# Patient Record
Sex: MALE | Race: WHITE | NOT HISPANIC OR LATINO | Employment: OTHER | URBAN - METROPOLITAN AREA
[De-identification: names, ages, dates, MRNs, and addresses within clinical notes are randomized per-mention and may not be internally consistent; named-entity substitution may affect disease eponyms.]

---

## 2017-04-17 ENCOUNTER — HOSPITAL ENCOUNTER (OUTPATIENT)
Dept: SLEEP MEDICINE | Facility: HOSPITAL | Age: 32
Discharge: HOME | End: 2017-04-17
Attending: FAMILY MEDICINE

## 2017-04-17 PROCEDURE — 95806 SLEEP STUDY UNATT&RESP EFFT: CPT

## 2021-02-24 PROBLEM — R10.9 ABDOMINAL PAIN: Status: ACTIVE | Noted: 2021-02-24

## 2021-04-28 ENCOUNTER — HOSPITAL ENCOUNTER (INPATIENT)
Facility: HOSPITAL | Age: 36
LOS: 5 days | Discharge: HOME | DRG: 885 | End: 2021-05-03
Attending: STUDENT IN AN ORGANIZED HEALTH CARE EDUCATION/TRAINING PROGRAM | Admitting: STUDENT IN AN ORGANIZED HEALTH CARE EDUCATION/TRAINING PROGRAM
Payer: COMMERCIAL

## 2021-04-28 DIAGNOSIS — R45.851 DEPRESSION WITH SUICIDAL IDEATION: ICD-10-CM

## 2021-04-28 DIAGNOSIS — I10 ESSENTIAL HYPERTENSION: ICD-10-CM

## 2021-04-28 DIAGNOSIS — I10 HYPERTENSION, UNSPECIFIED TYPE: ICD-10-CM

## 2021-04-28 DIAGNOSIS — F32.A DEPRESSION WITH SUICIDAL IDEATION: ICD-10-CM

## 2021-04-28 DIAGNOSIS — F33.2 MDD (MAJOR DEPRESSIVE DISORDER), RECURRENT SEVERE, WITHOUT PSYCHOSIS: Primary | ICD-10-CM

## 2021-04-28 LAB
CHOLEST SERPL-MCNC: 122 MG/DL (ref 120–199)
CHOLEST/HDLC SERPL: 3.1 {RATIO} (ref 2–5)
ESTIMATED AVG GLUCOSE: 88 MG/DL (ref 68–131)
HBA1C MFR BLD: 4.7 % (ref 4–5.6)
HDLC SERPL-MCNC: 39 MG/DL (ref 40–75)
HDLC SERPL: 32 % (ref 20–50)
LDLC SERPL CALC-MCNC: 65.6 MG/DL (ref 63–159)
NONHDLC SERPL-MCNC: 83 MG/DL
TRIGL SERPL-MCNC: 87 MG/DL (ref 30–150)

## 2021-04-28 PROCEDURE — 99252 PR INITIAL INPATIENT CONSULT,LEVL II: ICD-10-PCS | Mod: ,,, | Performed by: NURSE PRACTITIONER

## 2021-04-28 PROCEDURE — 99252 IP/OBS CONSLTJ NEW/EST SF 35: CPT | Mod: ,,, | Performed by: NURSE PRACTITIONER

## 2021-04-28 PROCEDURE — 99223 PR INITIAL HOSPITAL CARE,LEVL III: ICD-10-PCS | Mod: ,,, | Performed by: PSYCHIATRY & NEUROLOGY

## 2021-04-28 PROCEDURE — S4991 NICOTINE PATCH NONLEGEND: HCPCS | Performed by: STUDENT IN AN ORGANIZED HEALTH CARE EDUCATION/TRAINING PROGRAM

## 2021-04-28 PROCEDURE — 99223 1ST HOSP IP/OBS HIGH 75: CPT | Mod: ,,, | Performed by: PSYCHIATRY & NEUROLOGY

## 2021-04-28 PROCEDURE — 25000003 PHARM REV CODE 250: Performed by: PSYCHIATRY & NEUROLOGY

## 2021-04-28 PROCEDURE — 25000003 PHARM REV CODE 250: Performed by: STUDENT IN AN ORGANIZED HEALTH CARE EDUCATION/TRAINING PROGRAM

## 2021-04-28 PROCEDURE — 80061 LIPID PANEL: CPT | Performed by: STUDENT IN AN ORGANIZED HEALTH CARE EDUCATION/TRAINING PROGRAM

## 2021-04-28 PROCEDURE — 11400000 HC PSYCH PRIVATE ROOM

## 2021-04-28 PROCEDURE — 83036 HEMOGLOBIN GLYCOSYLATED A1C: CPT | Performed by: STUDENT IN AN ORGANIZED HEALTH CARE EDUCATION/TRAINING PROGRAM

## 2021-04-28 PROCEDURE — 90833 PSYTX W PT W E/M 30 MIN: CPT | Mod: ,,, | Performed by: PSYCHIATRY & NEUROLOGY

## 2021-04-28 PROCEDURE — 36415 COLL VENOUS BLD VENIPUNCTURE: CPT | Performed by: STUDENT IN AN ORGANIZED HEALTH CARE EDUCATION/TRAINING PROGRAM

## 2021-04-28 PROCEDURE — 90833 PR PSYCHOTHERAPY W/PATIENT W/E&M, 30 MIN (ADD ON): ICD-10-PCS | Mod: ,,, | Performed by: PSYCHIATRY & NEUROLOGY

## 2021-04-28 RX ORDER — FOLIC ACID 1 MG/1
1 TABLET ORAL DAILY
Status: DISCONTINUED | OUTPATIENT
Start: 2021-04-28 | End: 2021-05-03 | Stop reason: HOSPADM

## 2021-04-28 RX ORDER — HYDROXYZINE PAMOATE 50 MG/1
50 CAPSULE ORAL EVERY 6 HOURS PRN
Status: DISCONTINUED | OUTPATIENT
Start: 2021-04-28 | End: 2021-05-03 | Stop reason: HOSPADM

## 2021-04-28 RX ORDER — MUPIROCIN 20 MG/G
OINTMENT TOPICAL 2 TIMES DAILY
Status: DISCONTINUED | OUTPATIENT
Start: 2021-04-28 | End: 2021-04-28

## 2021-04-28 RX ORDER — MIRTAZAPINE 15 MG/1
15 TABLET, ORALLY DISINTEGRATING ORAL NIGHTLY
Status: DISCONTINUED | OUTPATIENT
Start: 2021-04-28 | End: 2021-05-03 | Stop reason: HOSPADM

## 2021-04-28 RX ORDER — OLANZAPINE 10 MG/2ML
10 INJECTION, POWDER, FOR SOLUTION INTRAMUSCULAR EVERY 4 HOURS PRN
Status: DISCONTINUED | OUTPATIENT
Start: 2021-04-28 | End: 2021-05-03 | Stop reason: HOSPADM

## 2021-04-28 RX ORDER — IBUPROFEN 200 MG
1 TABLET ORAL DAILY
Status: DISCONTINUED | OUTPATIENT
Start: 2021-04-28 | End: 2021-05-03 | Stop reason: HOSPADM

## 2021-04-28 RX ORDER — LISINOPRIL 10 MG/1
10 TABLET ORAL DAILY
Status: DISCONTINUED | OUTPATIENT
Start: 2021-04-28 | End: 2021-05-03 | Stop reason: HOSPADM

## 2021-04-28 RX ORDER — OLANZAPINE 10 MG/1
10 TABLET ORAL EVERY 4 HOURS PRN
Status: DISCONTINUED | OUTPATIENT
Start: 2021-04-28 | End: 2021-05-03 | Stop reason: HOSPADM

## 2021-04-28 RX ADMIN — HYDROXYZINE PAMOATE 50 MG: 50 CAPSULE ORAL at 02:04

## 2021-04-28 RX ADMIN — MIRTAZAPINE 15 MG: 15 TABLET, ORALLY DISINTEGRATING ORAL at 08:04

## 2021-04-28 RX ADMIN — LISINOPRIL 10 MG: 10 TABLET ORAL at 11:04

## 2021-04-28 RX ADMIN — NICOTINE 1 PATCH: 14 PATCH, EXTENDED RELEASE TRANSDERMAL at 08:04

## 2021-04-28 RX ADMIN — FOLIC ACID 1 MG: 1 TABLET ORAL at 08:04

## 2021-04-28 RX ADMIN — THERA TABS 1 TABLET: TAB at 08:04

## 2021-04-29 PROCEDURE — 25000003 PHARM REV CODE 250: Performed by: PSYCHIATRY & NEUROLOGY

## 2021-04-29 PROCEDURE — 90833 PR PSYCHOTHERAPY W/PATIENT W/E&M, 30 MIN (ADD ON): ICD-10-PCS | Mod: ,,, | Performed by: PSYCHIATRY & NEUROLOGY

## 2021-04-29 PROCEDURE — 11400000 HC PSYCH PRIVATE ROOM

## 2021-04-29 PROCEDURE — 99233 PR SUBSEQUENT HOSPITAL CARE,LEVL III: ICD-10-PCS | Mod: ,,, | Performed by: PSYCHIATRY & NEUROLOGY

## 2021-04-29 PROCEDURE — 90833 PSYTX W PT W E/M 30 MIN: CPT | Mod: ,,, | Performed by: PSYCHIATRY & NEUROLOGY

## 2021-04-29 PROCEDURE — 99233 SBSQ HOSP IP/OBS HIGH 50: CPT | Mod: ,,, | Performed by: PSYCHIATRY & NEUROLOGY

## 2021-04-29 RX ADMIN — LISINOPRIL 10 MG: 10 TABLET ORAL at 08:04

## 2021-04-29 RX ADMIN — MIRTAZAPINE 15 MG: 15 TABLET, ORALLY DISINTEGRATING ORAL at 08:04

## 2021-04-29 RX ADMIN — THERA TABS 1 TABLET: TAB at 08:04

## 2021-04-29 RX ADMIN — FOLIC ACID 1 MG: 1 TABLET ORAL at 08:04

## 2021-04-30 PROCEDURE — 99233 SBSQ HOSP IP/OBS HIGH 50: CPT | Mod: ,,, | Performed by: STUDENT IN AN ORGANIZED HEALTH CARE EDUCATION/TRAINING PROGRAM

## 2021-04-30 PROCEDURE — 11400000 HC PSYCH PRIVATE ROOM

## 2021-04-30 PROCEDURE — 25000003 PHARM REV CODE 250: Performed by: PSYCHIATRY & NEUROLOGY

## 2021-04-30 PROCEDURE — 25000003 PHARM REV CODE 250: Performed by: STUDENT IN AN ORGANIZED HEALTH CARE EDUCATION/TRAINING PROGRAM

## 2021-04-30 PROCEDURE — 99233 PR SUBSEQUENT HOSPITAL CARE,LEVL III: ICD-10-PCS | Mod: ,,, | Performed by: STUDENT IN AN ORGANIZED HEALTH CARE EDUCATION/TRAINING PROGRAM

## 2021-04-30 RX ORDER — SERTRALINE HYDROCHLORIDE 50 MG/1
50 TABLET, FILM COATED ORAL DAILY
Status: DISCONTINUED | OUTPATIENT
Start: 2021-04-30 | End: 2021-05-03 | Stop reason: HOSPADM

## 2021-04-30 RX ADMIN — FOLIC ACID 1 MG: 1 TABLET ORAL at 08:04

## 2021-04-30 RX ADMIN — MIRTAZAPINE 15 MG: 15 TABLET, ORALLY DISINTEGRATING ORAL at 08:04

## 2021-04-30 RX ADMIN — SERTRALINE HYDROCHLORIDE 50 MG: 50 TABLET ORAL at 12:04

## 2021-04-30 RX ADMIN — LISINOPRIL 10 MG: 10 TABLET ORAL at 08:04

## 2021-04-30 RX ADMIN — THERA TABS 1 TABLET: TAB at 08:04

## 2021-05-01 PROCEDURE — 99233 SBSQ HOSP IP/OBS HIGH 50: CPT | Mod: ,,, | Performed by: PSYCHIATRY & NEUROLOGY

## 2021-05-01 PROCEDURE — 25000003 PHARM REV CODE 250: Performed by: PSYCHIATRY & NEUROLOGY

## 2021-05-01 PROCEDURE — 90833 PSYTX W PT W E/M 30 MIN: CPT | Mod: ,,, | Performed by: PSYCHIATRY & NEUROLOGY

## 2021-05-01 PROCEDURE — 25000003 PHARM REV CODE 250: Performed by: STUDENT IN AN ORGANIZED HEALTH CARE EDUCATION/TRAINING PROGRAM

## 2021-05-01 PROCEDURE — 99233 PR SUBSEQUENT HOSPITAL CARE,LEVL III: ICD-10-PCS | Mod: ,,, | Performed by: PSYCHIATRY & NEUROLOGY

## 2021-05-01 PROCEDURE — 90833 PR PSYCHOTHERAPY W/PATIENT W/E&M, 30 MIN (ADD ON): ICD-10-PCS | Mod: ,,, | Performed by: PSYCHIATRY & NEUROLOGY

## 2021-05-01 PROCEDURE — 11400000 HC PSYCH PRIVATE ROOM

## 2021-05-01 RX ADMIN — THERA TABS 1 TABLET: TAB at 08:05

## 2021-05-01 RX ADMIN — LISINOPRIL 10 MG: 10 TABLET ORAL at 08:05

## 2021-05-01 RX ADMIN — FOLIC ACID 1 MG: 1 TABLET ORAL at 08:05

## 2021-05-01 RX ADMIN — SERTRALINE HYDROCHLORIDE 50 MG: 50 TABLET ORAL at 08:05

## 2021-05-01 RX ADMIN — MIRTAZAPINE 15 MG: 15 TABLET, ORALLY DISINTEGRATING ORAL at 08:05

## 2021-05-02 PROBLEM — F32.A DEPRESSION WITH SUICIDAL IDEATION: Status: RESOLVED | Noted: 2021-04-28 | Resolved: 2021-05-02

## 2021-05-02 PROBLEM — R45.851 DEPRESSION WITH SUICIDAL IDEATION: Status: RESOLVED | Noted: 2021-04-28 | Resolved: 2021-05-02

## 2021-05-02 PROCEDURE — 25000003 PHARM REV CODE 250: Performed by: PSYCHIATRY & NEUROLOGY

## 2021-05-02 PROCEDURE — 25000003 PHARM REV CODE 250: Performed by: STUDENT IN AN ORGANIZED HEALTH CARE EDUCATION/TRAINING PROGRAM

## 2021-05-02 PROCEDURE — 99233 PR SUBSEQUENT HOSPITAL CARE,LEVL III: ICD-10-PCS | Mod: ,,, | Performed by: PSYCHIATRY & NEUROLOGY

## 2021-05-02 PROCEDURE — 90833 PR PSYCHOTHERAPY W/PATIENT W/E&M, 30 MIN (ADD ON): ICD-10-PCS | Mod: ,,, | Performed by: PSYCHIATRY & NEUROLOGY

## 2021-05-02 PROCEDURE — 99233 SBSQ HOSP IP/OBS HIGH 50: CPT | Mod: ,,, | Performed by: PSYCHIATRY & NEUROLOGY

## 2021-05-02 PROCEDURE — 11400000 HC PSYCH PRIVATE ROOM

## 2021-05-02 PROCEDURE — 90833 PSYTX W PT W E/M 30 MIN: CPT | Mod: ,,, | Performed by: PSYCHIATRY & NEUROLOGY

## 2021-05-02 RX ORDER — MIRTAZAPINE 15 MG/1
15 TABLET, FILM COATED ORAL NIGHTLY
Qty: 30 TABLET | Refills: 2 | Status: SHIPPED | OUTPATIENT
Start: 2021-05-02 | End: 2023-02-14

## 2021-05-02 RX ORDER — LISINOPRIL 10 MG/1
10 TABLET ORAL DAILY
Qty: 30 TABLET | Refills: 2 | Status: SHIPPED | OUTPATIENT
Start: 2021-05-02 | End: 2023-02-14 | Stop reason: SDUPTHER

## 2021-05-02 RX ORDER — SERTRALINE HYDROCHLORIDE 50 MG/1
50 TABLET, FILM COATED ORAL DAILY
Qty: 30 TABLET | Refills: 2 | Status: SHIPPED | OUTPATIENT
Start: 2021-05-03 | End: 2023-02-14

## 2021-05-02 RX ORDER — IBUPROFEN 200 MG
1 TABLET ORAL DAILY
COMMUNITY
Start: 2021-05-02 | End: 2023-02-14

## 2021-05-02 RX ADMIN — THERA TABS 1 TABLET: TAB at 08:05

## 2021-05-02 RX ADMIN — FOLIC ACID 1 MG: 1 TABLET ORAL at 08:05

## 2021-05-02 RX ADMIN — MIRTAZAPINE 15 MG: 15 TABLET, ORALLY DISINTEGRATING ORAL at 08:05

## 2021-05-02 RX ADMIN — SERTRALINE HYDROCHLORIDE 50 MG: 50 TABLET ORAL at 08:05

## 2021-05-02 RX ADMIN — HYDROXYZINE PAMOATE 50 MG: 50 CAPSULE ORAL at 05:05

## 2021-05-02 RX ADMIN — LISINOPRIL 10 MG: 10 TABLET ORAL at 08:05

## 2021-05-03 VITALS
WEIGHT: 256.19 LBS | BODY MASS INDEX: 31.85 KG/M2 | SYSTOLIC BLOOD PRESSURE: 143 MMHG | RESPIRATION RATE: 20 BRPM | TEMPERATURE: 98 F | HEIGHT: 75 IN | HEART RATE: 71 BPM | OXYGEN SATURATION: 98 % | DIASTOLIC BLOOD PRESSURE: 91 MMHG

## 2021-05-03 PROCEDURE — 25000003 PHARM REV CODE 250: Performed by: PSYCHIATRY & NEUROLOGY

## 2021-05-03 PROCEDURE — 90833 PSYTX W PT W E/M 30 MIN: CPT | Mod: ,,, | Performed by: PSYCHIATRY & NEUROLOGY

## 2021-05-03 PROCEDURE — 99239 HOSP IP/OBS DSCHRG MGMT >30: CPT | Mod: ,,, | Performed by: PSYCHIATRY & NEUROLOGY

## 2021-05-03 PROCEDURE — 25000003 PHARM REV CODE 250: Performed by: STUDENT IN AN ORGANIZED HEALTH CARE EDUCATION/TRAINING PROGRAM

## 2021-05-03 PROCEDURE — 99239 PR HOSPITAL DISCHARGE DAY,>30 MIN: ICD-10-PCS | Mod: ,,, | Performed by: PSYCHIATRY & NEUROLOGY

## 2021-05-03 PROCEDURE — 90833 PR PSYCHOTHERAPY W/PATIENT W/E&M, 30 MIN (ADD ON): ICD-10-PCS | Mod: ,,, | Performed by: PSYCHIATRY & NEUROLOGY

## 2021-05-03 RX ADMIN — LISINOPRIL 10 MG: 10 TABLET ORAL at 08:05

## 2021-05-03 RX ADMIN — FOLIC ACID 1 MG: 1 TABLET ORAL at 08:05

## 2021-05-03 RX ADMIN — THERA TABS 1 TABLET: TAB at 08:05

## 2021-05-03 RX ADMIN — HYDROXYZINE PAMOATE 50 MG: 50 CAPSULE ORAL at 10:05

## 2021-05-03 RX ADMIN — SERTRALINE HYDROCHLORIDE 50 MG: 50 TABLET ORAL at 08:05

## 2021-05-04 ENCOUNTER — PATIENT OUTREACH (OUTPATIENT)
Dept: ADMINISTRATIVE | Facility: CLINIC | Age: 36
End: 2021-05-04

## 2021-05-06 ENCOUNTER — PATIENT MESSAGE (OUTPATIENT)
Dept: RESEARCH | Facility: HOSPITAL | Age: 36
End: 2021-05-06

## 2021-05-10 ENCOUNTER — PATIENT MESSAGE (OUTPATIENT)
Dept: RESEARCH | Facility: HOSPITAL | Age: 36
End: 2021-05-10

## 2021-07-01 ENCOUNTER — PATIENT MESSAGE (OUTPATIENT)
Dept: ADMINISTRATIVE | Facility: OTHER | Age: 36
End: 2021-07-01

## 2022-06-28 PROBLEM — R09.A2 FOREIGN BODY SENSATION IN THROAT: Status: ACTIVE | Noted: 2022-06-28

## 2023-07-27 ENCOUNTER — PATIENT OUTREACH (OUTPATIENT)
Dept: ADMINISTRATIVE | Facility: HOSPITAL | Age: 38
End: 2023-07-27
Payer: COMMERCIAL

## 2024-03-12 ENCOUNTER — HOSPITAL ENCOUNTER (EMERGENCY)
Facility: HOSPITAL | Age: 39
Discharge: HOME | End: 2024-03-13
Attending: STUDENT IN AN ORGANIZED HEALTH CARE EDUCATION/TRAINING PROGRAM
Payer: MEDICAID

## 2024-03-12 VITALS
HEART RATE: 84 BPM | TEMPERATURE: 98 F | BODY MASS INDEX: 38.84 KG/M2 | SYSTOLIC BLOOD PRESSURE: 183 MMHG | RESPIRATION RATE: 18 BRPM | DIASTOLIC BLOOD PRESSURE: 119 MMHG | WEIGHT: 310.75 LBS | OXYGEN SATURATION: 97 %

## 2024-03-12 DIAGNOSIS — M25.512 ACUTE PAIN OF LEFT SHOULDER: ICD-10-CM

## 2024-03-12 DIAGNOSIS — M25.532 ACUTE PAIN OF LEFT WRIST: ICD-10-CM

## 2024-03-12 DIAGNOSIS — M25.522 LEFT ELBOW PAIN: ICD-10-CM
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
## 2024-03-12 DIAGNOSIS — M79.642 LEFT HAND PAIN: ICD-10-CM

## 2024-03-12 DIAGNOSIS — S80.212A ABRASION OF LEFT KNEE, INITIAL ENCOUNTER: ICD-10-CM

## 2024-03-12 PROCEDURE — 99283 EMERGENCY DEPT VISIT LOW MDM: CPT | Mod: 25

## 2024-03-12 PROCEDURE — 25000003 PHARM REV CODE 250: Performed by: STUDENT IN AN ORGANIZED HEALTH CARE EDUCATION/TRAINING PROGRAM

## 2024-03-12 RX ORDER — OXYCODONE AND ACETAMINOPHEN 5; 325 MG/1; MG/1
1 TABLET ORAL EVERY 6 HOURS PRN
Qty: 15 TABLET | Refills: 0 | Status: SHIPPED | OUTPATIENT
Start: 2024-03-12

## 2024-03-12 RX ORDER — HYDROCODONE BITARTRATE AND ACETAMINOPHEN 5; 325 MG/1; MG/1
1 TABLET ORAL
Status: COMPLETED | OUTPATIENT
Start: 2024-03-12 | End: 2024-03-12

## 2024-03-12 RX ADMIN — BACITRACIN ZINC, NEOMYCIN, POLYMYXIN B 1 EACH: 400; 3.5; 5 OINTMENT TOPICAL at 10:03

## 2024-03-12 RX ADMIN — HYDROCODONE BITARTRATE AND ACETAMINOPHEN 1 TABLET: 5; 325 TABLET ORAL at 10:03

## 2024-03-13 NOTE — ED PROVIDER NOTES
Encounter Date: 3/12/2024       History     Chief Complaint   Patient presents with    Arm Injury     Pt arrives to the ed with c/o left wrist pain and swelling after a fall last night. Pt has abrasion to the left knee. Reports back pain and has hx of back injury.      38 year old male with a PMHx of anxiety, HTN, depression presents to the ED with his girlfriend after a fall. Patient tripped yesterday at his mom's house, hitting his head on the garage door. Doesn't remember any LOC but girlfriend said he was out for a moment. Denies any headaches, nausea, vomiting, neck pain, numbness, weakness, confusion. Reports ever since the fall - he has had low back pain, left hand pain, left wrist pain, left elbow pain, left shoulder pain, and a left knee abrasion. Denies saddle anesthesia, bowel incontinence, or bladder incontinence.         Review of patient's allergies indicates:   Allergen Reactions    Penicillins      Past Medical History:   Diagnosis Date    Allergy     PCN--rash    Anxiety     Depression     Gout     Hypertension      Past Surgical History:   Procedure Laterality Date    COLONOSCOPY N/A 2/24/2021    Procedure: COLONOSCOPY;  Surgeon: Tarun Pineda MD;  Location: [facility];  Service: Endoscopy;  Laterality: N/A;    EPIDURAL STEROID INJECTION      ESOPHAGOGASTRODUODENOSCOPY N/A 2/24/2021    Procedure: EGD (ESOPHAGOGASTRODUODENOSCOPY);  Surgeon: Tarun Pineda MD;  Location: [facility];  Service: Endoscopy;  Laterality: N/A;     Family History   Problem Relation Age of Onset    No Known Problems Mother     Hypertension Father     Arthritis Father      Social History     Tobacco Use    Smoking status: Former     Types: Cigars    Smokeless tobacco: Former     Types: Chew   Substance Use Topics    Alcohol use: Yes     Comment: glass of liquor every other day    Drug use: Yes     Types: Marijuana     Review of Systems   Constitutional:  Negative for chills and fever.   HENT:  Negative for  congestion, rhinorrhea and sneezing.    Eyes:  Negative for discharge and redness.   Respiratory:  Negative for cough and shortness of breath.    Cardiovascular:  Negative for chest pain and palpitations.   Gastrointestinal:  Negative for abdominal pain, diarrhea and vomiting.   Genitourinary:  Negative for difficulty urinating, flank pain and urgency.   Musculoskeletal:  Positive for arthralgias and back pain. Negative for neck pain.   Skin:  Positive for wound. Negative for rash.   Neurological:  Negative for weakness, numbness and headaches.       Physical Exam     Initial Vitals [03/12/24 2127]   BP Pulse Resp Temp SpO2   (!) 183/119 84 16 98.3 °F (36.8 °C) 97 %      MAP       --         Physical Exam    Nursing note and vitals reviewed.  Constitutional: He appears well-developed. He is not diaphoretic. No distress.   HENT:   Head: Normocephalic and atraumatic.   Right Ear: External ear normal.   Left Ear: External ear normal.   Nose: Nose normal.   Eyes: Conjunctivae are normal. Right eye exhibits no discharge. Left eye exhibits no discharge. No scleral icterus.   Cardiovascular:  Normal rate and regular rhythm.           Pulmonary/Chest: Breath sounds normal. No stridor. No respiratory distress. He has no wheezes. He has no rhonchi. He has no rales.   Abdominal: Abdomen is soft. He exhibits no distension. There is no abdominal tenderness. There is no guarding.   Musculoskeletal:         General: No edema.      Left shoulder: Tenderness present. No bony tenderness. Decreased range of motion.      Left upper arm: No tenderness or bony tenderness.      Left elbow: Decreased range of motion. Tenderness present.      Left wrist: Swelling, tenderness, bony tenderness and snuff box tenderness present. Decreased range of motion.      Left hand: Tenderness and bony tenderness present. Decreased range of motion.      Cervical back: No tenderness or bony tenderness. Normal range of motion.      Thoracic back: No  tenderness or bony tenderness.      Lumbar back: Tenderness and bony tenderness present.      Left knee: No bony tenderness. Normal range of motion. No tenderness.      Comments: Ambulatory     Neurological: He is alert and oriented to person, place, and time. He has normal strength. No cranial nerve deficit or sensory deficit. GCS score is 15. GCS eye subscore is 4. GCS verbal subscore is 5. GCS motor subscore is 6.   Skin: Skin is warm and dry. Capillary refill takes less than 2 seconds.   Psychiatric: He has a normal mood and affect.         ED Course   Procedures  Labs Reviewed - No data to display       Imaging Results              X-Ray Hand 3 view Left (Final result)  Result time 03/12/24 23:01:42      Final result by Cipriano Penny MD (03/12/24 23:01:42)                   Impression:      No acute fracture.      Electronically signed by: Cipriano Penny MD  Date:    03/12/2024  Time:    23:01               Narrative:    EXAMINATION:  XR HAND COMPLETE 3 VIEW LEFT    CLINICAL HISTORY:  fall;    TECHNIQUE:  PA, lateral, and oblique views of the left hand were performed.    COMPARISON:  None    FINDINGS:  No fracture or dislocation.  Unremarkable visualized bony structures.      Soft tissues are unremarkable.                                       X-Ray Wrist Complete Left (Final result)  Result time 03/12/24 23:01:58      Final result by Cipriano Penny MD (03/12/24 23:01:58)                   Impression:      No acute fracture.      Electronically signed by: Cipriano Penny MD  Date:    03/12/2024  Time:    23:01               Narrative:    EXAMINATION:  XR WRIST COMPLETE 3 VIEWS LEFT    CLINICAL HISTORY:  Unspecified fall, initial encounter    TECHNIQUE:  PA, lateral, and oblique views of the left wrist were performed.    COMPARISON:  None    FINDINGS:  No fracture or dislocation.  Cartilage spaces are maintained.  Soft tissues are unremarkable.                                       X-Ray Shoulder 2  or More Views Left (Final result)  Result time 03/12/24 23:02:18      Final result by Cipriano Penny MD (03/12/24 23:02:18)                   Impression:      No acute fracture.      Electronically signed by: Cipriano Penny MD  Date:    03/12/2024  Time:    23:02               Narrative:    EXAMINATION:  XR SHOULDER COMPLETE 2 OR MORE VIEWS LEFT    CLINICAL HISTORY:  fall;    TECHNIQUE:  Two or three views of the left shoulder were performed.    COMPARISON:  None    FINDINGS:  Bones: No fracture.  No lytic or blastic lesion.    Joints: No evidence for glenohumeral dislocation.  Acromioclavicular joint is unremarkable.    Soft tissues: Unremarkable.                                       X-Ray Elbow Complete Left (Final result)  Result time 03/12/24 23:02:08      Final result by Cipriano Penny MD (03/12/24 23:02:08)                   Impression:      There is no evidence of fracture or subluxation.      Electronically signed by: Cipriano Penny MD  Date:    03/12/2024  Time:    23:02               Narrative:    EXAMINATION:  XR ELBOW COMPLETE 3 VIEW LEFT    CLINICAL HISTORY:  Unspecified fall, initial encounter    TECHNIQUE:  AP, lateral, and oblique views of the left elbow were performed.    COMPARISON:  None    FINDINGS:  No fractures or dislocations.  Unremarkable visualized bony structures. No fat pad elevation.                                       X-Ray Lumbar Spine Ap And Lateral (Final result)  Result time 03/12/24 23:04:14      Final result by Cipriano Penny MD (03/12/24 23:04:14)                   Impression:      No acute lumbar fracture.      Electronically signed by: Cipriano Penny MD  Date:    03/12/2024  Time:    23:04               Narrative:    EXAMINATION:  XR LUMBAR SPINE AP AND LATERAL    CLINICAL HISTORY:  fall, acute low back pain;    TECHNIQUE:  AP, lateral and spot images were performed of the lumbar spine.    COMPARISON:  09/27/2004.    FINDINGS:  Alignment: Alignment is  maintained.    Vertebrae: Vertebral body heights are maintained.  Partial sacralization of L5 on the left, similar compared to prior.    Discs and facets: Disc heights are maintained.  Mild degenerative change lower lumbar facet joints.    Miscellaneous: No additional findings.                                       Medications   HYDROcodone-acetaminophen 5-325 mg per tablet 1 tablet (1 tablet Oral Given 3/12/24 2204)   neomycin-bacitracnZn-polymyxnB packet 1 each (1 each Topical (Top) Given 3/12/24 2235)     Medical Decision Making  Ddx: shoulder fx, elbow fx, wrist fx, scaphoid fx, hand fx, knee fx, head bleed, skull fx, cspine fx, tspine fx, lspine fx    38 year old male with fall. Fortunately, no fractures or dislocations noted on exam. However, there is pain at the scaphoid so will immobilize wrist with brace, place in sling, and refer to hand surgery for further evaluation. Patient is in agreement.     Amount and/or Complexity of Data Reviewed  Radiology: ordered.    Risk  OTC drugs.  Prescription drug management.                                      Clinical Impression:  Final diagnoses:  [W19.XXXA] Fall (Primary)  [M25.512] Acute pain of left shoulder  [M25.522] Left elbow pain  [M25.532] Acute pain of left wrist  [M79.642] Left hand pain  [S80.212A] Abrasion of left knee, initial encounter          ED Disposition Condition    Discharge Stable          ED Prescriptions       Medication Sig Dispense Start Date End Date Auth. Provider    oxyCODONE-acetaminophen (PERCOCET) 5-325 mg per tablet Take 1 tablet by mouth every 6 (six) hours as needed for Pain. 15 tablet 3/12/2024 -- Edward Aguayo, DO          Follow-up Information       Follow up With Specialties Details Why Contact Info    Sandra Ulrich, FNP Family Medicine Schedule an appointment as soon as possible for a visit in 3 days  [address]  [place] [address]  [phone]      Jonathan Vu MD Orthopedic Surgery Schedule an appointment as soon  as possible for a visit in 1 week  [address]  [address]  [place] [address]  [phone]               Edward Aguayo DO  03/13/24 0039

## 2024-09-25 ENCOUNTER — OCCUPATIONAL HEALTH (OUTPATIENT)
Dept: URGENT CARE | Facility: CLINIC | Age: 39
End: 2024-09-25

## 2024-09-25 DIAGNOSIS — Z02.83 ENCOUNTER FOR DRUG SCREENING: Primary | ICD-10-CM

## 2024-09-25 PROCEDURE — 80305 DRUG TEST PRSMV DIR OPT OBS: CPT | Mod: S$GLB,,, | Performed by: PHYSICIAN ASSISTANT

## 2024-09-25 NOTE — PROGRESS NOTES
Patient presented to clinic for a Random DCFS drug screen. The following were collected Hair and urine. Paperwork checked by carlos.